# Patient Record
Sex: FEMALE | Race: BLACK OR AFRICAN AMERICAN | Employment: UNEMPLOYED | URBAN - METROPOLITAN AREA
[De-identification: names, ages, dates, MRNs, and addresses within clinical notes are randomized per-mention and may not be internally consistent; named-entity substitution may affect disease eponyms.]

---

## 2024-01-17 ENCOUNTER — HOSPITAL ENCOUNTER (EMERGENCY)
Facility: HOSPITAL | Age: 19
Discharge: HOME | End: 2024-01-17
Payer: COMMERCIAL

## 2024-01-17 VITALS
HEIGHT: 70 IN | DIASTOLIC BLOOD PRESSURE: 69 MMHG | RESPIRATION RATE: 16 BRPM | WEIGHT: 179.9 LBS | TEMPERATURE: 97.3 F | BODY MASS INDEX: 25.75 KG/M2 | SYSTOLIC BLOOD PRESSURE: 130 MMHG | OXYGEN SATURATION: 97 % | HEART RATE: 83 BPM

## 2024-01-17 DIAGNOSIS — D23.30 DERMOID CYST OF FACE: Primary | ICD-10-CM

## 2024-01-17 PROCEDURE — 99283 EMERGENCY DEPT VISIT LOW MDM: CPT

## 2024-01-17 RX ORDER — DOXYCYCLINE HYCLATE 100 MG
100 TABLET ORAL 2 TIMES DAILY
Qty: 14 TABLET | Refills: 0 | Status: SHIPPED | OUTPATIENT
Start: 2024-01-17 | End: 2024-01-24

## 2024-01-17 RX ORDER — IBUPROFEN 600 MG/1
600 TABLET ORAL EVERY 8 HOURS PRN
Qty: 20 TABLET | Refills: 0 | Status: SHIPPED | OUTPATIENT
Start: 2024-01-17

## 2024-01-17 ASSESSMENT — PAIN DESCRIPTION - LOCATION: LOCATION: EAR

## 2024-01-17 ASSESSMENT — PAIN DESCRIPTION - DESCRIPTORS: DESCRIPTORS: ACHING

## 2024-01-17 ASSESSMENT — VISUAL ACUITY: OU: 1

## 2024-01-17 ASSESSMENT — LIFESTYLE VARIABLES
HOW MANY STANDARD DRINKS CONTAINING ALCOHOL DO YOU HAVE ON A TYPICAL DAY: 1 OR 2
HOW OFTEN DO YOU HAVE A DRINK CONTAINING ALCOHOL: MONTHLY OR LESS

## 2024-01-17 ASSESSMENT — PAIN DESCRIPTION - ORIENTATION: ORIENTATION: LEFT

## 2024-01-17 ASSESSMENT — PAIN SCALES - GENERAL: PAINLEVEL_OUTOF10: 9

## 2024-01-18 NOTE — ED PROVIDER NOTES
[facility] EMERGENCY DEPT  EMERGENCY DEPARTMENT ENCOUNTER       Pt Name: Marielle Powell  MRN: [id number]  Birthdate 2005  Date of evaluation: 1/17/2024  Provider: GAGE Lisa   PCP: Keiry Lee MD  Note Started: 10:54 PM EST 1/17/24     CHIEF COMPLAINT       Chief Complaint   Patient presents with    Skin Problem     Pt ambulatory to triage with c/o \"bump\" and pain behind L ear that she noticed this morning; no drainage;         HISTORY OF PRESENT ILLNESS: 1 or more elements      History From: Patient  HPI Limitations: None     Marielle Powell is a 18 y.o. female who presents ambulatory with a tender \"bump\" just underneath the skin on the left side of her face at the jawline.  She has no pain or bump behind her left ear.  She denies any similar lesions elsewhere.  She tells me she does have a history of eczema and is followed by dermatology with Dr. Morales.  She denies any fevers.  She has no known medication allergies.     Nursing Notes were all reviewed and agreed with or any disagreements were addressed in the HPI.     REVIEW OF SYSTEMS      Review of Systems   Skin:         Tender bump just underneath the skin on the left side of the face at the jawline        Positives and Pertinent negatives as per HPI.    PAST HISTORY     Past Medical History:  No past medical history on file.    Past Surgical History:  No past surgical history on file.    Family History:  No family history on file.    Social History:       Allergies:  No Known Allergies    CURRENT MEDICATIONS      Previous Medications    No medications on file       SCREENINGS               No data recorded        PHYSICAL EXAM      ED Triage Vitals   Enc Vitals Group      BP 01/17/24 2041 130/69      Pulse 01/17/24 2041 83      Respirations 01/17/24 2041 16      Temp 01/17/24 2041 97.3 °F (36.3 °C)      Temp Source 01/17/24 2041 Oral      SpO2 01/17/24 2041 97 %      Weight - Scale 01/17/24 2030 81.6 kg (179 lb 14.3 oz)      Height 01/17/24 2030 1.778 m